# Patient Record
Sex: MALE | Race: WHITE | Employment: FULL TIME | URBAN - METROPOLITAN AREA
[De-identification: names, ages, dates, MRNs, and addresses within clinical notes are randomized per-mention and may not be internally consistent; named-entity substitution may affect disease eponyms.]

---

## 2018-01-03 ENCOUNTER — HOSPITAL ENCOUNTER (EMERGENCY)
Facility: CLINIC | Age: 48
Discharge: OTHER INSTITUTION | End: 2018-01-03
Attending: EMERGENCY MEDICINE | Admitting: EMERGENCY MEDICINE

## 2018-01-03 VITALS
OXYGEN SATURATION: 97 % | TEMPERATURE: 97.3 F | HEART RATE: 76 BPM | SYSTOLIC BLOOD PRESSURE: 104 MMHG | BODY MASS INDEX: 25.73 KG/M2 | RESPIRATION RATE: 18 BRPM | WEIGHT: 190 LBS | DIASTOLIC BLOOD PRESSURE: 69 MMHG | HEIGHT: 72 IN

## 2018-01-03 DIAGNOSIS — F10.920 ALCOHOLIC INTOXICATION WITHOUT COMPLICATION (H): ICD-10-CM

## 2018-01-03 DIAGNOSIS — F10.10 ALCOHOL ABUSE: ICD-10-CM

## 2018-01-03 LAB — ALCOHOL BREATH TEST: 0.26 (ref 0–0.01)

## 2018-01-03 PROCEDURE — 82075 ASSAY OF BREATH ETHANOL: CPT

## 2018-01-03 PROCEDURE — 99285 EMERGENCY DEPT VISIT HI MDM: CPT | Mod: 25

## 2018-01-03 ASSESSMENT — ENCOUNTER SYMPTOMS
NERVOUS/ANXIOUS: 1
ABDOMINAL PAIN: 0
SEIZURES: 0
DYSURIA: 0
AGITATION: 1
HALLUCINATIONS: 0
HEMATURIA: 0
TREMORS: 0

## 2018-01-03 NOTE — ED PROVIDER NOTES
History     Chief Complaint:  Alcohol Intoxication    HPI:   Gary Moore is a 47 year old, otherwise healthy male who presents for evaluation of alcohol intoxication. The patient reports that for the past six months, he has been feeling persistently feverish and chilled, alternating between the two. In order to make himself feel better, he has been drinking about 1/2 gallon of vodka per day. He endorses that prior to this he had been drinking, but not nearly this much. Today, he felt much worse than usual and knew that he needed help to stop drinking all together. He called the police in hopes of being directly transferred to detox.     Unfortunately for the patient, he was brought to the ED for medical clearance prior to detox. Upon his ED arrival, he became very upset and was verbally aggressive to nursing staff. After calming down at the time of his evaluation, he specifically voices that he does not wish to have a work up in the ED as he had this three months ago and everything was normal per his report. He simply wishes to be transferred to detox and him not being there now is making him increasingly anxious. Additionally, he endorses bilateral flank pain for the past month, but denies dysuria, hematuria, or abdominal pain. He denies history of alcohol withdrawal symptoms including seizures, hallucinations, or tremors. He also denies any suicidal or homicidal ideation in the past or at this time.     Allergies:  No known drug allergies      Medications:    The patient is not currently taking any prescribed medications.      Past Medical History:    The patient does not have any past pertinent medical history.     Past Surgical History:    History reviewed. No pertinent surgical history.     Family History:    History reviewed. No pertinent family history.      Social History:  Alcohol use: Yes -- 1/2 gallon of vodka per day for the past six months   Marital Status:     Works at a [employer].       Review of Systems   Respiratory: Negative for shortness of breath.    Cardiovascular: Negative for chest pain.   Gastrointestinal: Negative for abdominal pain.   Genitourinary: Negative for dysuria and hematuria.   Neurological: Negative for tremors and seizures.   Psychiatric/Behavioral: Positive for agitation. Negative for hallucinations and suicidal ideas. The patient is nervous/anxious.    All other systems reviewed and are negative.      Physical Exam     Patient Vitals for the past 24 hrs:   BP Temp Temp src Pulse Heart Rate Resp SpO2 Height Weight   01/03/18 2016 104/69 - - - 86 - 97 % - -   01/03/18 1751 (!) 127/95 97.3  F (36.3  C) Temporal 76 - 18 99 % 1.829 m (6') 86.2 kg (190 lb)   01/03/18 1745 (!) 127/95 - - - - - 100 % - -      Physical Exam:  General: Alert and cooperative with exam. Patient in mild distress. Intoxicated.   Head:  Scalp is NC/AT  Eyes:  No scleral icterus, PERRL,   ENT:  The external nose and ears are normal. The oropharynx is normal and without erythema; mucus membranes are moist. Uvula midline, no evidence of deep space infection.  Neck:  Normal range of motion without rigidity.  CV:  Regular rate and rhythm    No pathologic murmur   Resp:  Breath sounds are clear bilaterally    Non-labored, no retractions or accessory muscle use  GI:  Abdomen is soft, no distension, no tenderness. No peritoneal signs  MS:  No lower extremity edema   Skin:  Warm and dry, No rash or lesions noted.  Neuro: Oriented x 3. No gross motor deficits.    Emergency Department Course     Emergency Department Course:  Past medical records, nursing notes, and vitals reviewed.  1720: I performed an exam of the patient and obtained history, as documented above. GCS 15.    Alcohol breath test obtained. This returned at 0.26.     Findings and plan explained to the Patient.    1856: Patient will be transferred to [address] [place] [address] Detox facility via EMS. Discussed the case with detox facility who will admit the  patient to a monitored bed for further monitoring, evaluation, and treatment.      Impression & Plan      Medical Decision Making:  Gary Moore is a 47 year old male who presents for evaluation of alcohol abuse. They are intoxicated here in ED by a breath test. I recommended further medical evaluation including basic laboratory work but the patient declined. The patient has no history of DT's or alcohol withdrawal seizures. He denies co-ingestion including acetaminophen, drugs, medications, or volatile alcohols.  He has no signs of trauma related to alcohol use and no further work up is needed including head CT. The patient was transferred to inpatient detox per the patient request and was stable at the time of transfer.     Diagnosis:    ICD-10-CM    1. Alcoholic intoxication without complication (H) F10.920    2. Alcohol abuse F10.10      Disposition:  Transferred to detox. ([address])    Samantha Ricardo  1/3/2018    EMERGENCY DEPARTMENT    I, Samantha Ricardo, am serving as a scribe at 5:20 PM on 1/3/2018 to document services personally performed by Mason Daniels DO based on my observations and the provider's statements to me.       Mason Daniels DO  01/04/18 1545

## 2018-01-04 ASSESSMENT — ENCOUNTER SYMPTOMS: SHORTNESS OF BREATH: 0

## 2018-01-04 NOTE — ED NOTES
"Pt states last drink was at home just before he called the PD. Pt lives with his brother and states he called PD and wants to go to detox because \"I don't want to die\". Pt denies wanting to drink himself to death.  "

## 2018-01-04 NOTE — ED NOTES
Pt refusing to change into scrubs and states to EDT he wants to have his brother pick him up. Pt calling brother. EDT letting MD know.

## 2018-01-04 NOTE — ED NOTES
Spoke with [address] [place] [address] ([phone]) and they state they have a bed available. Report given to SHARON Cerda.

## 2019-04-09 ENCOUNTER — OFFICE VISIT (OUTPATIENT)
Dept: FAMILY MEDICINE | Facility: CLINIC | Age: 49
End: 2019-04-09
Payer: COMMERCIAL

## 2019-04-09 VITALS
BODY MASS INDEX: 25.75 KG/M2 | SYSTOLIC BLOOD PRESSURE: 134 MMHG | HEIGHT: 72 IN | OXYGEN SATURATION: 98 % | WEIGHT: 190.13 LBS | RESPIRATION RATE: 14 BRPM | DIASTOLIC BLOOD PRESSURE: 90 MMHG | TEMPERATURE: 97.9 F | HEART RATE: 78 BPM

## 2019-04-09 DIAGNOSIS — K40.90 NON-RECURRENT UNILATERAL INGUINAL HERNIA WITHOUT OBSTRUCTION OR GANGRENE: Primary | ICD-10-CM

## 2019-04-09 DIAGNOSIS — R03.0 ELEVATED BP WITHOUT DIAGNOSIS OF HYPERTENSION: ICD-10-CM

## 2019-04-09 DIAGNOSIS — Z86.39 HISTORY OF HYPOTESTOSTERONEMIA: ICD-10-CM

## 2019-04-09 DIAGNOSIS — N52.8 OTHER MALE ERECTILE DYSFUNCTION: ICD-10-CM

## 2019-04-09 PROCEDURE — 84270 ASSAY OF SEX HORMONE GLOBUL: CPT | Performed by: PHYSICIAN ASSISTANT

## 2019-04-09 PROCEDURE — 36415 COLL VENOUS BLD VENIPUNCTURE: CPT | Performed by: PHYSICIAN ASSISTANT

## 2019-04-09 PROCEDURE — 84403 ASSAY OF TOTAL TESTOSTERONE: CPT | Performed by: PHYSICIAN ASSISTANT

## 2019-04-09 PROCEDURE — 99214 OFFICE O/P EST MOD 30 MIN: CPT | Performed by: PHYSICIAN ASSISTANT

## 2019-04-09 RX ORDER — SILDENAFIL CITRATE 20 MG/1
20 TABLET ORAL 3 TIMES DAILY
Qty: 30 TABLET | Refills: 11 | Status: SHIPPED | OUTPATIENT
Start: 2019-04-09 | End: 2019-07-09

## 2019-04-09 ASSESSMENT — MIFFLIN-ST. JEOR: SCORE: 1780.4

## 2019-04-09 NOTE — PROGRESS NOTES
SUBJECTIVE:   Gary Moore is a 48 year old male who presents to clinic today for the following   health issues:      Hernia:  Pt has had problem with hernia X 10 years. Pt states it is making daily activities difficult.    No n/v/d/c. Worse after heavy lifting.     Mildly elevated blood pressure.   No chest pain/sob/palps. Has quit drinking. Adopted.        Additional history: as documented    Reviewed  and updated as needed this visit by clinical staff  Tobacco  Allergies  Meds  Soc Hx        Reviewed and updated as needed this visit by Provider         There is no problem list on file for this patient.    No past surgical history on file.    Social History     Tobacco Use     Smoking status: Current Every Day Smoker     Smokeless tobacco: Current User   Substance Use Topics     Alcohol use: Yes     Comment: 1/2 gallon-1 liter vodka daily for 4 months     No family history on file.      Current Outpatient Medications   Medication Sig Dispense Refill     sildenafil (REVATIO) 20 MG tablet Take 1 tablet (20 mg) by mouth 3 times daily Take 2-5 tabs daily prn 30 tablet 11     No Known Allergies  No lab results found.   BP Readings from Last 3 Encounters:   04/09/19 134/90   01/03/18 104/69    Wt Readings from Last 3 Encounters:   04/09/19 86.2 kg (190 lb 2 oz)   01/03/18 86.2 kg (190 lb)                  Labs reviewed in EPIC    All other systems negative except as outline above  OBJECTIVE:    CHEST:chest clear to IPPA, no tachypnea, retractions or cyanosis and S1, S2 normal, no murmur, no gallop, rate regular.  The abdomen is soft without tenderness, guarding, mass, rebound or organomegaly. Bowel sounds are normal. No CVA tenderness or inguinal adenopathy noted.  Right inguinal hernia present. Reducible .     Gary was seen today for hernia.    Diagnoses and all orders for this visit:    Non-recurrent unilateral inguinal hernia without obstruction or gangrene  -     GENERAL SURG ADULT REFERRAL    History of  hypotestosteronemia  -     Testosterone Free and Total    Elevated BP without diagnosis of hypertension    Other male erectile dysfunction  -     sildenafil (REVATIO) 20 MG tablet; Take 1 tablet (20 mg) by mouth 3 times daily Take 2-5 tabs daily prn      work on lifestyle modification

## 2019-04-09 NOTE — LETTER
May 13, 2019      Gary Moore  [address] [place] [id number]        Dear ,    We are writing to inform you of your test results.    Your recent testosterone level came back nice and normal.     Resulted Orders   Testosterone Free and Total   Result Value Ref Range    Testosterone Total 486 240 - 950 ng/dL      Comment:      This test was developed and its performance characteristics determined by the   [facility],  Special [facility] Laboratory. It has   not been cleared or approved by the FDA. The laboratory is regulated under   CLIA as qualified to perform high-complexity testing. This test is used for   clinical purposes. It should not be regarded as investigational or for   research.      Sex Hormone Binding Globulin 37 11 - 80 nmol/L    Free Testosterone Calculated 9.47 4.7 - 24.4 ng/dL       If you have any questions or concerns, please call the clinic at the number listed above.       Sincerely,        Conrado Andre PA-C/martín

## 2019-04-10 LAB
SHBG SERPL-SCNC: 37 NMOL/L (ref 11–80)
TESTOST FREE SERPL-MCNC: 9.47 NG/DL (ref 4.7–24.4)
TESTOST SERPL-MCNC: 486 NG/DL (ref 240–950)

## 2019-04-16 ENCOUNTER — OFFICE VISIT (OUTPATIENT)
Dept: SURGERY | Facility: CLINIC | Age: 49
End: 2019-04-16
Payer: COMMERCIAL

## 2019-04-16 ENCOUNTER — TELEPHONE (OUTPATIENT)
Dept: SURGERY | Facility: CLINIC | Age: 49
End: 2019-04-16

## 2019-04-16 VITALS
HEART RATE: 97 BPM | BODY MASS INDEX: 25.77 KG/M2 | DIASTOLIC BLOOD PRESSURE: 80 MMHG | WEIGHT: 190 LBS | TEMPERATURE: 98.1 F | SYSTOLIC BLOOD PRESSURE: 130 MMHG

## 2019-04-16 DIAGNOSIS — K40.90 RIGHT INGUINAL HERNIA: Primary | ICD-10-CM

## 2019-04-16 PROCEDURE — 99204 OFFICE O/P NEW MOD 45 MIN: CPT | Performed by: SURGERY

## 2019-04-16 ASSESSMENT — PAIN SCALES - GENERAL: PAINLEVEL: NO PAIN (0)

## 2019-04-16 NOTE — LETTER
4/16/2019         RE: Gary Moore  [address] [place] [id number]        Dear Colleague,    Thank you for referring your patient, Gary Moore, to the [facility]. Please see a copy of my visit note below.    Dear Dr. Conrado huang    I have seen Gary Moore and as you know his chief complaint is right groin pain  usually caused by lifting.  So at work has to lift and after lifting it is sharp pain and goes from testicles to where he can feel it sticking out. .    HPI:  Patient is a 48 year old male  with complaints see above  The patient noticed the symptoms about 3 years ago.    Patient has not family history of hernia problems patient is adopted  Sitting down  makes the episode better.  Smokes 3/4 ppd.     Review Of Systems  Respiratory: No shortness of breath, dyspnea on exertion, cough, or hemoptysis  Cardiovascular: negative  Gastrointestinal: negative  Endocrine: negative  :  negative  /80   Pulse 97   Temp 98.1  F (36.7  C) (Oral)   Wt 86.2 kg (190 lb)   BMI 25.77 kg/m       History reviewed. No pertinent past medical history.    History reviewed. No pertinent surgical history.    Social History     Socioeconomic History     Marital status: Single     Spouse name: Not on file     Number of children: Not on file     Years of education: Not on file     Highest education level: Not on file   Occupational History     Not on file   Social Needs     Financial resource strain: Not on file     Food insecurity:     Worry: Not on file     Inability: Not on file     Transportation needs:     Medical: Not on file     Non-medical: Not on file   Tobacco Use     Smoking status: Current Every Day Smoker     Smokeless tobacco: Current User   Substance and Sexual Activity     Alcohol use: Yes     Comment: 1/2 gallon-1 liter vodka daily for 4 months     Drug use: No     Sexual activity: Not Currently   Lifestyle     Physical activity:     Days per week: Not on file     Minutes per  session: Not on file     Stress: Not on file   Relationships     Social connections:     Talks on phone: Not on file     Gets together: Not on file     Attends Yazdanism service: Not on file     Active member of club or organization: Not on file     Attends meetings of clubs or organizations: Not on file     Relationship status: Not on file     Intimate partner violence:     Fear of current or ex partner: Not on file     Emotionally abused: Not on file     Physically abused: Not on file     Forced sexual activity: Not on file   Other Topics Concern     Not on file   Social History Narrative     Not on file       Current Outpatient Medications   Medication Sig Dispense Refill     sildenafil (REVATIO) 20 MG tablet Take 1 tablet (20 mg) by mouth 3 times daily Take 2-5 tabs daily prn (Patient not taking: Reported on 4/16/2019) 30 tablet 11       10 Point review of systems all others are negative.   Above was reviewed  Physical exam: /80   Pulse 97   Temp 98.1  F (36.7  C) (Oral)   Wt 86.2 kg (190 lb)   BMI 25.77 kg/m      Patient able to get up on table without difficulty.   Patient is alert and orientated.   Head eyes, nose and mouth within normal limits.  No supraclavicular or cervical adenopathy palpated.  Thyroid within normal limits.  No carotid bruits auscultated.  Lungs are clear to auscultation  Heart is regular rate and rhythm with no murmur or thrills noted.  No costal vertebral angle tenderness noted.  Abdomen is abdomen is soft without significant tenderness, masses, organomegaly or guarding  bowel sounds are positive and no caput medusa noted.  Has an obvious right inguinal hernia no umbilical and no obvious left inguinal hernia noted.   Testicles are normal.    Skin was warm and pink  Normal Affect    Lower extremity edema is not present.      Assessment: right inguinal hernia   dicussed open versus  Laparoscopic robotic and since only on the right side could do either way.  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    Plan to do laparoscopic robotic right inguinal hernia possible left side if has one.  .  If recurs and is smoking has to quit smoking before doing a redo surgery.   Risks of surgery include damage to nerves, bleeding, infection, damage to  Vessels, recurrence.  Although mesh is a better long term repair if it gets infected it must be removed.   If the patient has any bacterial infection the week before and is seen by their doctor and started on antibiotics, I can probably still do the surgery if they are vastly improved by the time of surgery, but if the infection starts closer to the surgery date it will be better to cancel and reschedule to a later date.  A cough will also be hard on the repair and uncomfortable post operative.  If the patient is a smoker I did discuss increase risk of recurrence and more pain with the cough.  If the patient is willing to quit smoking would encourage to do so and start at least a week before surgery.  However, if patient is not going to quit then must understand that his repair is more likely to fail.    Risks of surgery discussed including, but not limited to bleeding, infection, recurrence, damage to nerves and what is in the hernia sac.  Risks of anesthesia also discussed.    Discussed massaging hernia back in and using ice if becomes more painful.  If not able to reduce then go to emergency room.  Also discussed hernia belt to use until able to get in for surgery.    Time spent with the patient with greater that 50% of the time in discussion was 30 minutes.  In discussing the options and quitting smoking.      Vern Landin MD      Again, thank you for allowing me to participate in the care of your patient.        Sincerely,        Vern Landin MD

## 2019-04-16 NOTE — PATIENT INSTRUCTIONS
Assessment: right inguinal hernia   dicussed open versus  Laparoscopic robotic and since only on the right side could do either way.  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    Plan to do laparoscopic robotic right inguinal hernia possible left side if has one.  .  If recurs and is smoking has to quit smoking before doing a redo surgery.   Risks of surgery include damage to nerves, bleeding, infection, damage to  Vessels, recurrence.  Although mesh is a better long term repair if it gets infected it must be removed.   If the patient has any bacterial infection the week before and is seen by their doctor and started on antibiotics, I can probably still do the surgery if they are vastly improved by the time of surgery, but if the infection starts closer to the surgery date it will be better to cancel and reschedule to a later date.  A cough will also be hard on the repair and uncomfortable post operative.  If the patient is a smoker I did discuss increase risk of recurrence and more pain with the cough.  If the patient is willing to quit smoking would encourage to do so and start at least a week before surgery.  However, if patient is not going to quit then must understand that his repair is more likely to fail.    Risks of surgery discussed including, but not limited to bleeding, infection, recurrence, damage to nerves and what is in the hernia sac.  Risks of anesthesia also discussed.    Discussed massaging hernia back in and using ice if becomes more painful.  If not able to reduce then go to emergency room.  Also discussed hernia belt to use until able to get in for surgery.      HERNIORRHAPHY DISCHARGE INSTRUCTIONS  DR. TONY ACEVEDO    1. You may resume your regular diet when you feel you are ready to. DO NOT drink alcoholic beverages for 24 hours or while you are taking prescription medication.    2. Limit your activities for the first 48 hours. Gradually, increase them as tolerated. You may use stairs. I  encourage you to walk as tolerated. No lifting greater that 20 pounds for 3-6 weeks.    3. You will have some discomfort at the incision sites. This is expected. This should improve over the next 2-3 days. Ice and pain medication will help with this pain. Use prescribed pain medication as instructed.    4. Bruising and mild swelling is normal after surgery. For males it is common to have bruising going into the penis and scrotum. The area below and around the incision(s) will be hard and elevated. This is normal. I call it the healing ridge. This will resolve slowly over the next several months. If you feel the pain is increasing and cannot explain it by increasing activity please call us at [phone].    5. The dressing will often have some blood on it. You may shower 24 hours after surgery. Clean gently over incision site. If clear plastic covering or steri-strip comes off and there is still some bleeding or drainage then cover with gauze or band-aid. If no bleeding, there is no reason to cover site. The abdominal binder may be removed after 24 hours after surgery. You may continue to wear it however for comfort. I suggest  you wear an old t-shirt under the abdominal binder for a more comfortable wear.    6. Avoid Aspirin for the first 72 hours after the procedure. This medication may increase the tendency to bleed.    7. Use the following medications (in addition to your normal meds) as shown:  a. Percocet 5 mg 1-2 every 6 hours as needed for severe pain. This contains 325 mg of Tylenol (acetaminophen) per tablet.  Please do not take more than 4 grams of Tylenol (acetaminophen) per day. For example, you may take 1 Percocet and 1 Tylenol, or 2 Percocet and no Tylenol, or 2 Tylenol and no Percocet every 6 hours.  b. Tylenol (acetaminophen) 500 mg every 6 hours as needed for mild pain. Do not take more than 1000 mg every 6 hours. (see above).  c. Motrin (ibuprofen) 200-800 mg every 6 hours as needed for mild to  moderate pain. Take with food.     8. Notify Dr. Landin's clinic at [phone] if:    Your discomfort is not relieved by your pain medication.    You have signs of infection such as temperature above 100.5 degrees orally, chills, or increasing daily discomfort.    Incision site is becoming more red and/or there is purulent drainage.    You have questions or concerns.    9. Please call [phone] to schedule a follow up appointment in about 2 weeks.    10. When taking narcotics (pain medication more than Tylenol [acetaminophen] and Motrin [ibuprofen]) it is important to keep your stools soft to avoid constipation and pain with straining. This is best done by drinking fluids (non-alcoholic and non-caffeinated) and taking a stool softener (i.e. Metamucil or milk of magnesia). You may be able to use non-narcotics for pain relief especially by the 3rd post- operative day. Tylenol (acetaminophen) 500 mg every 6 hours and/or Motrin (ibuprofen) 200-800 mg every 6 hours. Please do not take more than 4 grams of Tylenol (acetaminophen) per day. Remember your Percocet does have Tylenol (acetaminophen) already in it. Please take Motrin (ibuprofen) with food to help protect the stomach. If you have a history of stomach ulcers or stomach problems, do not take Motrin (ibuprofen).     11. Do not drive or operate heavy machinery for 24 hours after surgery or when taking narcotics. You may resume driving when feel that you can safely avoid an accident and are not taking narcotics. This is usually 5 to 7 days after surgery. You should not be alone for 24 hours after surgery.    12. Have milk of magnesia available at home so that when you take the pain medications you take 1-2 teaspoons a day,  to help reduce problems with constipation.

## 2019-04-16 NOTE — TELEPHONE ENCOUNTER
Type of surgery: Laparoscopic robotic right inguinal hernia possible left side CPT code 84788  Right inguinal hernia [K40.90]  - Primary   Location of surgery: [facility]  Date and time of surgery: 06/05/2019 / 7:45 am  Surgeon: Mushtaq  Pre-Op Appt Date: 05/28/2019  Post-Op Appt Date: 07/02/2019   Packet sent out: Yes  Pre-cert/Authorization completed: No prior auth required per PO website.     Date: 04/16/2019    Sent to [facility] and was received. 05/08/2019    Insurance valid 06/03/2019

## 2019-04-16 NOTE — PROGRESS NOTES
Dear Dr. Conrado huang    I have seen Gary Moore and as you know his chief complaint is right groin pain  usually caused by lifting.  So at work has to lift and after lifting it is sharp pain and goes from testicles to where he can feel it sticking out. .    HPI:  Patient is a 48 year old male  with complaints see above  The patient noticed the symptoms about 3 years ago.    Patient has not family history of hernia problems patient is adopted  Sitting down  makes the episode better.  Smokes 3/4 ppd.     Review Of Systems  Respiratory: No shortness of breath, dyspnea on exertion, cough, or hemoptysis  Cardiovascular: negative  Gastrointestinal: negative  Endocrine: negative  :  negative  /80   Pulse 97   Temp 98.1  F (36.7  C) (Oral)   Wt 86.2 kg (190 lb)   BMI 25.77 kg/m      History reviewed. No pertinent past medical history.    History reviewed. No pertinent surgical history.    Social History     Socioeconomic History     Marital status: Single     Spouse name: Not on file     Number of children: Not on file     Years of education: Not on file     Highest education level: Not on file   Occupational History     Not on file   Social Needs     Financial resource strain: Not on file     Food insecurity:     Worry: Not on file     Inability: Not on file     Transportation needs:     Medical: Not on file     Non-medical: Not on file   Tobacco Use     Smoking status: Current Every Day Smoker     Smokeless tobacco: Current User   Substance and Sexual Activity     Alcohol use: Yes     Comment: 1/2 gallon-1 liter vodka daily for 4 months     Drug use: No     Sexual activity: Not Currently   Lifestyle     Physical activity:     Days per week: Not on file     Minutes per session: Not on file     Stress: Not on file   Relationships     Social connections:     Talks on phone: Not on file     Gets together: Not on file     Attends Taoist service: Not on file     Active member of club or organization: Not on file      Attends meetings of clubs or organizations: Not on file     Relationship status: Not on file     Intimate partner violence:     Fear of current or ex partner: Not on file     Emotionally abused: Not on file     Physically abused: Not on file     Forced sexual activity: Not on file   Other Topics Concern     Not on file   Social History Narrative     Not on file       Current Outpatient Medications   Medication Sig Dispense Refill     sildenafil (REVATIO) 20 MG tablet Take 1 tablet (20 mg) by mouth 3 times daily Take 2-5 tabs daily prn (Patient not taking: Reported on 4/16/2019) 30 tablet 11       10 Point review of systems all others are negative.   Above was reviewed  Physical exam: /80   Pulse 97   Temp 98.1  F (36.7  C) (Oral)   Wt 86.2 kg (190 lb)   BMI 25.77 kg/m     Patient able to get up on table without difficulty.   Patient is alert and orientated.   Head eyes, nose and mouth within normal limits.  No supraclavicular or cervical adenopathy palpated.  Thyroid within normal limits.  No carotid bruits auscultated.  Lungs are clear to auscultation  Heart is regular rate and rhythm with no murmur or thrills noted.  No costal vertebral angle tenderness noted.  Abdomen is abdomen is soft without significant tenderness, masses, organomegaly or guarding  bowel sounds are positive and no caput medusa noted.  Has an obvious right inguinal hernia no umbilical and no obvious left inguinal hernia noted.   Testicles are normal.    Skin was warm and pink  Normal Affect    Lower extremity edema is not present.      Assessment: right inguinal hernia   dicussed open versus  Laparoscopic robotic and since only on the right side could do either way.  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    Plan to do laparoscopic robotic right inguinal hernia possible left side if has one.  .  If recurs and is smoking has to quit smoking before doing a redo surgery.   Risks of surgery include damage to nerves, bleeding,  infection, damage to  Vessels, recurrence.  Although mesh is a better long term repair if it gets infected it must be removed.   If the patient has any bacterial infection the week before and is seen by their doctor and started on antibiotics, I can probably still do the surgery if they are vastly improved by the time of surgery, but if the infection starts closer to the surgery date it will be better to cancel and reschedule to a later date.  A cough will also be hard on the repair and uncomfortable post operative.  If the patient is a smoker I did discuss increase risk of recurrence and more pain with the cough.  If the patient is willing to quit smoking would encourage to do so and start at least a week before surgery.  However, if patient is not going to quit then must understand that his repair is more likely to fail.    Risks of surgery discussed including, but not limited to bleeding, infection, recurrence, damage to nerves and what is in the hernia sac.  Risks of anesthesia also discussed.    Discussed massaging hernia back in and using ice if becomes more painful.  If not able to reduce then go to emergency room.  Also discussed hernia belt to use until able to get in for surgery.    Time spent with the patient with greater that 50% of the time in discussion was 30 minutes.  In discussing the options and quitting smoking.      Vern Landin MD

## 2019-05-28 ENCOUNTER — OFFICE VISIT (OUTPATIENT)
Dept: FAMILY MEDICINE | Facility: CLINIC | Age: 49
End: 2019-05-28
Payer: COMMERCIAL

## 2019-05-28 VITALS
WEIGHT: 190 LBS | DIASTOLIC BLOOD PRESSURE: 89 MMHG | TEMPERATURE: 99.1 F | RESPIRATION RATE: 18 BRPM | HEART RATE: 98 BPM | SYSTOLIC BLOOD PRESSURE: 132 MMHG | HEIGHT: 72 IN | BODY MASS INDEX: 25.73 KG/M2 | OXYGEN SATURATION: 98 %

## 2019-05-28 DIAGNOSIS — R03.0 ELEVATED BP WITHOUT DIAGNOSIS OF HYPERTENSION: ICD-10-CM

## 2019-05-28 DIAGNOSIS — Z01.818 PREOP GENERAL PHYSICAL EXAM: Primary | ICD-10-CM

## 2019-05-28 DIAGNOSIS — K40.90 NON-RECURRENT UNILATERAL INGUINAL HERNIA WITHOUT OBSTRUCTION OR GANGRENE: ICD-10-CM

## 2019-05-28 LAB
ALBUMIN SERPL-MCNC: 4.6 G/DL (ref 3.4–5)
ALP SERPL-CCNC: 63 U/L (ref 40–150)
ALT SERPL W P-5'-P-CCNC: 61 U/L (ref 0–70)
ANION GAP SERPL CALCULATED.3IONS-SCNC: 9 MMOL/L (ref 3–14)
AST SERPL W P-5'-P-CCNC: 40 U/L (ref 0–45)
BILIRUB SERPL-MCNC: 0.6 MG/DL (ref 0.2–1.3)
BUN SERPL-MCNC: 11 MG/DL (ref 7–30)
CALCIUM SERPL-MCNC: 9.4 MG/DL (ref 8.5–10.1)
CHLORIDE SERPL-SCNC: 101 MMOL/L (ref 94–109)
CO2 SERPL-SCNC: 25 MMOL/L (ref 20–32)
CREAT SERPL-MCNC: 0.84 MG/DL (ref 0.66–1.25)
GFR SERPL CREATININE-BSD FRML MDRD: >90 ML/MIN/{1.73_M2}
GLUCOSE SERPL-MCNC: 101 MG/DL (ref 70–99)
POTASSIUM SERPL-SCNC: 4.1 MMOL/L (ref 3.4–5.3)
PROT SERPL-MCNC: 7.7 G/DL (ref 6.8–8.8)
SODIUM SERPL-SCNC: 135 MMOL/L (ref 133–144)

## 2019-05-28 PROCEDURE — 99214 OFFICE O/P EST MOD 30 MIN: CPT | Performed by: PHYSICIAN ASSISTANT

## 2019-05-28 PROCEDURE — 36415 COLL VENOUS BLD VENIPUNCTURE: CPT | Performed by: PHYSICIAN ASSISTANT

## 2019-05-28 PROCEDURE — 80053 COMPREHEN METABOLIC PANEL: CPT | Performed by: PHYSICIAN ASSISTANT

## 2019-05-28 ASSESSMENT — MIFFLIN-ST. JEOR: SCORE: 1779.83

## 2019-05-28 NOTE — PROGRESS NOTES
[facility] [place]  [address]  [place] [address]  [phone]  Dept: [phone]    PRE-OP EVALUATION:  Today's date: 2019    Gary Moore (: 1970) presents for pre-operative evaluation assessment as requested by Dr. Landin.  He requires evaluation and anesthesia risk assessment prior to undergoing surgery/procedure for treatment of hernia .    Proposed Surgery/ Procedure:  Laparoscopic robotic right inguinal hernia possible     Date of Surgery/ Procedure: 19  Time of Surgery/ Procedure: 745am  Hospital/Surgical Facility: [facility]  Fax number for surgical facility:   Primary Physician: No Ref-Primary, Physician  Type of Anesthesia Anticipated: to be determined    Patient has a Health Care Directive or Living Will:  NO    1. NO - Do you have a history of heart attack, stroke, stent, bypass or surgery on an artery in the head, neck, heart or legs?  2. NO - Do you ever have any pain or discomfort in your chest?  3. NO - Do you have a history of  Heart Failure?  4. NO - Are you troubled by shortness of breath when: walking on the level, up a slight hill or at night?  5. NO - Do you currently have a cold, bronchitis or other respiratory infection?  6. NO - Do you have a cough, shortness of breath or wheezing?  7. NO - Do you sometimes get pains in the calves of your legs when you walk?  8. NO - Do you or anyone in your family have previous history of blood clots?  9. NO - Do you or does anyone in your family have a serious bleeding problem such as prolonged bleeding following surgeries or cuts?  10. NO - Have you ever had problems with anemia or been told to take iron pills?  11. NO - Have you had any abnormal blood loss such as black, tarry or bloody stools, or abnormal vaginal bleeding?  12. NO - Have you ever had a blood transfusion?  13. NO - Have you or any of your relatives ever had problems with anesthesia?  14. NO - Do you have sleep apnea, excessive snoring or  daytime drowsiness?  15. NO - Do you have any prosthetic heart valves?  16. NO - Do you have prosthetic joints?  17. NO - Is there any chance that you may be pregnant?      HPI:     HPI related to upcoming procedure: right inguinal hernia.      See problem list for active medical problems.  Problems all longstanding and stable, except as noted/documented.  See ROS for pertinent symptoms related to these conditions.                                                                                                                                                          .    MEDICAL HISTORY:   There are no active problems to display for this patient.     No past medical history on file.  No past surgical history on file.  Current Outpatient Medications   Medication Sig Dispense Refill     sildenafil (REVATIO) 20 MG tablet Take 1 tablet (20 mg) by mouth 3 times daily Take 2-5 tabs daily prn 30 tablet 11     OTC products: None, except as noted above    No Known Allergies   Latex Allergy: NO    Social History     Tobacco Use     Smoking status: Current Every Day Smoker     Smokeless tobacco: Current User   Substance Use Topics     Alcohol use: Yes     Comment: 1/2 gallon-1 liter vodka daily for 4 months     History   Drug Use No       REVIEW OF SYSTEMS:   Constitutional, neuro, ENT, endocrine, pulmonary, cardiac, gastrointestinal, genitourinary, musculoskeletal, integument and psychiatric systems are negative, except as otherwise noted.    EXAM:   /89   Pulse 98   Temp 99.1  F (37.3  C) (Tympanic)   Resp 18   Ht 1.829 m (6')   Wt 86.2 kg (190 lb)   SpO2 98%   BMI 25.77 kg/m    GENERAL APPEARANCE: healthy, alert and no distress  HENT: ear canals and TM's normal and nose and mouth without ulcers or lesions  RESP: lungs clear to auscultation - no rales, rhonchi or wheezes  CV: regular rate and rhythm, normal S1 S2, no S3 or S4 and no murmur, click or rub   ABDOMEN: soft, nontender, no HSM or masses and bowel  sounds normal  NEURO: Normal strength and tone, sensory exam grossly normal, mentation intact and speech normal    DIAGNOSTICS:       No results for input(s): HGB, PLT, INR, NA, POTASSIUM, CR, A1C in the last 88041 hours.     IMPRESSION:   Gary was seen today for pre-op exam.    Diagnoses and all orders for this visit:    Preop general physical exam    Non-recurrent unilateral inguinal hernia without obstruction or gangrene    Elevated BP without diagnosis of hypertension  -     Comprehensive metabolic panel      Historically his blood pressure has been very normal. Suspect mild acute elevation is due to his etoh consumption over the weekend.     The proposed surgical procedure is considered INTERMEDIATE risk.    REVISED CARDIAC RISK INDEX  The patient has the following serious cardiovascular risks for perioperative complications such as (MI, PE, VFib and 3  AV Block):  No serious cardiac risks  INTERPRETATION: 0 risks: Class I (very low risk - 0.4% complication rate)    The patient has the following additional risks for perioperative complications:  No identified additional risks        RECOMMENDATIONS:         --Patient is on no chronic medications    APPROVAL GIVEN to proceed with proposed procedure, without further diagnostic evaluation       Signed Electronically by: Conrado Andre PA-C    Copy of this evaluation report is provided to requesting physician.    [facility] Preop Guidelines    Revised Cardiac Risk Index

## 2019-05-28 NOTE — LETTER
May 29, 2019      Gary Moore  [address] [place] [id number]        Dear ,    We are writing to inform you of your test results.    Your liver and kidney function came back nice and normal.     Resulted Orders   Comprehensive metabolic panel   Result Value Ref Range    Sodium 135 133 - 144 mmol/L    Potassium 4.1 3.4 - 5.3 mmol/L    Chloride 101 94 - 109 mmol/L    Carbon Dioxide 25 20 - 32 mmol/L    Anion Gap 9 3 - 14 mmol/L    Glucose 101 (H) 70 - 99 mg/dL    Urea Nitrogen 11 7 - 30 mg/dL    Creatinine 0.84 0.66 - 1.25 mg/dL    GFR Estimate >90 >60 mL/min/[1.73_m2]      Comment:      Non  GFR Calc  Starting 12/18/2018, serum creatinine based estimated GFR (eGFR) will be   calculated using the Chronic Kidney Disease Epidemiology Collaboration   (CKD-EPI) equation.      GFR Estimate If Black >90 >60 mL/min/[1.73_m2]      Comment:       GFR Calc  Starting 12/18/2018, serum creatinine based estimated GFR (eGFR) will be   calculated using the Chronic Kidney Disease Epidemiology Collaboration   (CKD-EPI) equation.      Calcium 9.4 8.5 - 10.1 mg/dL    Bilirubin Total 0.6 0.2 - 1.3 mg/dL    Albumin 4.6 3.4 - 5.0 g/dL    Protein Total 7.7 6.8 - 8.8 g/dL    Alkaline Phosphatase 63 40 - 150 U/L    ALT 61 0 - 70 U/L    AST 40 0 - 45 U/L       If you have any questions or concerns, please call the clinic at the number listed above.       Sincerely,        Conrado Andre PA-C/martín

## 2019-06-05 ENCOUNTER — TRANSFERRED RECORDS (OUTPATIENT)
Dept: HEALTH INFORMATION MANAGEMENT | Facility: CLINIC | Age: 49
End: 2019-06-05

## 2019-07-09 ENCOUNTER — TELEPHONE (OUTPATIENT)
Dept: FAMILY MEDICINE | Facility: CLINIC | Age: 49
End: 2019-07-09

## 2019-07-09 DIAGNOSIS — N52.8 OTHER MALE ERECTILE DYSFUNCTION: ICD-10-CM

## 2019-07-09 RX ORDER — SILDENAFIL CITRATE 20 MG/1
TABLET ORAL
Qty: 30 TABLET | Refills: 11 | Status: SHIPPED | OUTPATIENT
Start: 2019-07-09

## 2019-07-09 NOTE — TELEPHONE ENCOUNTER
Please clarify sildenafil 20mg sig instructions:    Sig - Route: Take 1 tablet (20 mg) by mouth 3 times daily Take 2-5 tabs daily prn - Oral    Danette Garcia, RN, BSN, PHN

## 2019-10-09 ENCOUNTER — ANCILLARY PROCEDURE (OUTPATIENT)
Dept: GENERAL RADIOLOGY | Facility: CLINIC | Age: 49
End: 2019-10-09
Attending: NURSE PRACTITIONER
Payer: COMMERCIAL

## 2019-10-09 ENCOUNTER — OFFICE VISIT (OUTPATIENT)
Dept: URGENT CARE | Facility: URGENT CARE | Age: 49
End: 2019-10-09
Payer: OTHER MISCELLANEOUS

## 2019-10-09 VITALS
WEIGHT: 185.2 LBS | HEART RATE: 92 BPM | SYSTOLIC BLOOD PRESSURE: 169 MMHG | TEMPERATURE: 98.8 F | BODY MASS INDEX: 25.12 KG/M2 | OXYGEN SATURATION: 97 % | DIASTOLIC BLOOD PRESSURE: 109 MMHG

## 2019-10-09 DIAGNOSIS — S49.91XA SHOULDER INJURY, RIGHT, INITIAL ENCOUNTER: Primary | ICD-10-CM

## 2019-10-09 DIAGNOSIS — S43.401A SPRAIN OF RIGHT SHOULDER, UNSPECIFIED SHOULDER SPRAIN TYPE, INITIAL ENCOUNTER: ICD-10-CM

## 2019-10-09 PROCEDURE — 73030 X-RAY EXAM OF SHOULDER: CPT | Mod: RT

## 2019-10-09 PROCEDURE — 99213 OFFICE O/P EST LOW 20 MIN: CPT | Performed by: NURSE PRACTITIONER

## 2019-10-09 RX ORDER — METHYLPREDNISOLONE 4 MG
TABLET, DOSE PACK ORAL
Qty: 21 TABLET | Refills: 0 | Status: SHIPPED | OUTPATIENT
Start: 2019-10-09

## 2019-10-09 ASSESSMENT — ENCOUNTER SYMPTOMS
RHINORRHEA: 0
SHORTNESS OF BREATH: 0
NAUSEA: 0
FEVER: 0
DIARRHEA: 0
VOMITING: 0
COUGH: 0
DIAPHORESIS: 0
SORE THROAT: 0
CHILLS: 0

## 2019-10-09 NOTE — PATIENT INSTRUCTIONS
Patient Education     Exercises for Shoulder Flexibility: Internal Rotation    This stretch can help restore shoulder flexibility and relieve pain over time. When stretching, be sure to breathe deeply. Follow any special instructions from your healthcare provider or physical therapist.  1. While seated, move the arm on the side you want to stretch toward the middle of your back. The palm of your hand should face out.  2. Cup your other hand under the hand that s behind your back. Gently push your cupped hand upward until you feel the stretch in the shoulder. Try to hold the stretch for 5 seconds.  3. Work up to doing 3 sets of this stretch, 3 times a day. Work up to holding the stretch for 30 to 60 seconds.  Note: Keep your back straight. It s OK if your hand can t reach the middle of your back. Instead, start the stretch with your hand as close as you can get it to the middle of your back.  Frozen shoulder  Frozen shoulder is another name for adhesive capsulitis. This causes restricted movement in the shoulder. If you have frozen shoulder, this stretch may cause discomfort, especially when you first get started. A few months may pass before you achieve the results you want. But once your shoulder heals, it rarely becomes frozen again. So stick to your stretching program. If you have any questions, be sure to ask your healthcare provider.   Date Last Reviewed: 12/1/2017 2000-2018 [employer]. [address] [place] [address], [place] [address]. All rights reserved. This information is not intended as a substitute for professional medical care. Always follow your healthcare professional's instructions.           Patient Education     Exercises for Shoulder Flexibility: External Rotation    This stretch can help restore shoulder flexibility and relieve pain over time. When stretching, be sure to breathe deeply. Follow any special instructions from your healthcare provider or physical therapist:  1.  a  doorway. Grasp the doorjamb with the hand on the frozen side. Your arm should be bent.  2. With the other hand, hold the elbow on the side with the involved frozen (stiff) shoulder firmly against your body.  3. Standing in the same spot, rotate your body away from the doorjamb. Stop when you feel the stretch in the shoulder. At first, try to hold the stretch for 5 seconds.  4. Work up to doing 3 sets of this stretch, 3 times a day. Work up to holding the stretch for 30 to 60 seconds.  Note: Keep your arms as still as you can. Over time, rotate your body a little more to enhance the stretch. But be careful not to twist your back.  Frozen shoulder is another name for adhesive capsulitis, which causes restricted movement in the shoulder. If you have frozen shoulder, this stretch may cause discomfort, especially when you first get started. A few months may pass before you achieve the results you want. But once your shoulder heals, it rarely becomes frozen again. So stick to your stretching program. If you have any questions, be sure to ask your healthcare provider.  Date Last Reviewed: 3/1/2018    8774-6369 [employer]. [address] [place] [address], [place] [address]. All rights reserved. This information is not intended as a substitute for professional medical care. Always follow your healthcare professional's instructions.

## 2019-10-09 NOTE — PROGRESS NOTES
SUBJECTIVE:   Gary Moore is a 49 year old male presenting with a chief complaint of   Chief Complaint   Patient presents with     Shoulder Pain     Patient complains of  pain in right shoulder.       He is an established patient of [facility].    Right shoulder Injury/Pain    Onset of symptoms was 3 week(s) ago.  Location: right shoulder  Context:       The injury happened while at work      Mechanism: pulled a load flipper at work and felt a pop on right shoulder and since then there is pain      Patient experienced immediate pain, was able to bear weight directly after injury  Course of symptoms is the same, not feeling better    Severity moderate  Current and Associated symptoms: Pain and Decreased range of motion  Denies  Swelling and Warmth  Aggravating Factors: weight-bearing, repetitive motion and flexion/extension  Therapies to improve symptoms include: ibuprofen  This is the first time this type of problem has occurred for this patient.     Review of Systems   Constitutional: Negative for chills, diaphoresis and fever.   HENT: Negative for congestion, ear pain, rhinorrhea and sore throat.    Respiratory: Negative for cough and shortness of breath.    Gastrointestinal: Negative for diarrhea, nausea and vomiting.   Musculoskeletal:        Right shoulder injury   All other systems reviewed and are negative.      No past medical history on file.  No family history on file.  Current Outpatient Medications   Medication Sig Dispense Refill     methylPREDNISolone (MEDROL DOSEPAK) 4 MG tablet therapy pack Follow Package Directions 21 tablet 0     sildenafil (REVATIO) 20 MG tablet Take 2-5 tabs daily prn (Patient not taking: Reported on 10/9/2019) 30 tablet 11     Social History     Tobacco Use     Smoking status: Current Every Day Smoker     Smokeless tobacco: Current User   Substance Use Topics     Alcohol use: Yes     Comment: 1/2 gallon-1 liter vodka daily for 4 months       OBJECTIVE  BP (!) 169/109 (BP  Location: Left arm, Patient Position: Chair, Cuff Size: Adult Regular)   Pulse 92   Temp 98.8  F (37.1  C) (Oral)   Wt 84 kg (185 lb 3.2 oz)   SpO2 97%   BMI 25.12 kg/m      Physical Exam  Vitals signs and nursing note reviewed.   Constitutional:       General: He is not in acute distress.     Appearance: He is not diaphoretic.   HENT:      Head: Normocephalic and atraumatic.      Right Ear: Tympanic membrane and external ear normal.      Left Ear: Tympanic membrane and external ear normal.   Eyes:      Pupils: Pupils are equal, round, and reactive to light.   Neck:      Musculoskeletal: Normal range of motion and neck supple.   Pulmonary:      Effort: Pulmonary effort is normal. No respiratory distress.      Breath sounds: Normal breath sounds.   Musculoskeletal:      Comments: tenderness of anterior right shoulder, worse with upward reaching.Full ROM in all other planes.  No swelling noted. Neurovascular exam in normal.    Lymphadenopathy:      Cervical: No cervical adenopathy.   Skin:     General: Skin is warm and dry.   Neurological:      Mental Status: He is alert.      Cranial Nerves: No cranial nerve deficit.         Labs:  Results for orders placed or performed in visit on 10/09/19 (from the past 24 hour(s))   XR Shoulder Right 2 Views    Narrative    XR SHOULDER 2 VIEW RIGHT 10/9/2019 12:37 PM     HISTORY: injured right shoulder 3 weeks ago and still having pain and  reduced ROM on upward reaching. No swelling; Shoulder injury, right,  initial encounter      Impression    IMPRESSION: Borderline prominence of the achromic regular joint space  with. Otherwise unremarkable shoulder radiographs.    THOMAS CARDENAS MD       X-Ray was done, my findings are: no fracture on right shoulder    ASSESSMENT:      ICD-10-CM    1. Shoulder injury, right, initial encounter S49.91XA XR Shoulder Right 2 Views     methylPREDNISolone (MEDROL DOSEPAK) 4 MG tablet therapy pack   2. Sprain of right shoulder, unspecified  shoulder sprain type, initial encounter S43.401G         Medical Decision Making:    Differential Diagnosis:  MS Injury Pain: sprain, tendonitis, muscle strain, dislocation and osteoarthritis    Serious Comorbid Conditions:      PLAN:  Rest painful area  ICE  Elevated  Pain medication as advised  Side effects of medication discussed  As pain subsides, stretching is advised  Consider physical therapy if pain is persisting after symptomatic treatment  All questions answered and patient is in agreement with treatment paln      Patient Instructions       Patient Education     Exercises for Shoulder Flexibility: Internal Rotation    This stretch can help restore shoulder flexibility and relieve pain over time. When stretching, be sure to breathe deeply. Follow any special instructions from your healthcare provider or physical therapist.  1. While seated, move the arm on the side you want to stretch toward the middle of your back. The palm of your hand should face out.  2. Cup your other hand under the hand that s behind your back. Gently push your cupped hand upward until you feel the stretch in the shoulder. Try to hold the stretch for 5 seconds.  3. Work up to doing 3 sets of this stretch, 3 times a day. Work up to holding the stretch for 30 to 60 seconds.  Note: Keep your back straight. It s OK if your hand can t reach the middle of your back. Instead, start the stretch with your hand as close as you can get it to the middle of your back.  Frozen shoulder  Frozen shoulder is another name for adhesive capsulitis. This causes restricted movement in the shoulder. If you have frozen shoulder, this stretch may cause discomfort, especially when you first get started. A few months may pass before you achieve the results you want. But once your shoulder heals, it rarely becomes frozen again. So stick to your stretching program. If you have any questions, be sure to ask your healthcare provider.   Date Last Reviewed:  12/1/2017 2000-2018 [employer]. [address] [place] [address]. All rights reserved. This information is not intended as a substitute for professional medical care. Always follow your healthcare professional's instructions.           Patient Education     Exercises for Shoulder Flexibility: External Rotation    This stretch can help restore shoulder flexibility and relieve pain over time. When stretching, be sure to breathe deeply. Follow any special instructions from your healthcare provider or physical therapist:  1.  a doorway. Grasp the doorjamb with the hand on the frozen side. Your arm should be bent.  2. With the other hand, hold the elbow on the side with the involved frozen (stiff) shoulder firmly against your body.  3. Standing in the same spot, rotate your body away from the doorjamb. Stop when you feel the stretch in the shoulder. At first, try to hold the stretch for 5 seconds.  4. Work up to doing 3 sets of this stretch, 3 times a day. Work up to holding the stretch for 30 to 60 seconds.  Note: Keep your arms as still as you can. Over time, rotate your body a little more to enhance the stretch. But be careful not to twist your back.  Frozen shoulder is another name for adhesive capsulitis, which causes restricted movement in the shoulder. If you have frozen shoulder, this stretch may cause discomfort, especially when you first get started. A few months may pass before you achieve the results you want. But once your shoulder heals, it rarely becomes frozen again. So stick to your stretching program. If you have any questions, be sure to ask your healthcare provider.  Date Last Reviewed: 3/1/2018    1518-7014 [employer]. [address] [place] [address]. All rights reserved. This information is not intended as a substitute for professional medical care. Always follow your healthcare professional's instructions.